# Patient Record
Sex: FEMALE | Race: BLACK OR AFRICAN AMERICAN | NOT HISPANIC OR LATINO | Employment: OTHER | URBAN - METROPOLITAN AREA
[De-identification: names, ages, dates, MRNs, and addresses within clinical notes are randomized per-mention and may not be internally consistent; named-entity substitution may affect disease eponyms.]

---

## 2024-04-17 ENCOUNTER — HOSPITAL ENCOUNTER (EMERGENCY)
Facility: HOSPITAL | Age: 74
Discharge: HOME | End: 2024-04-17
Attending: EMERGENCY MEDICINE
Payer: COMMERCIAL

## 2024-04-17 VITALS
RESPIRATION RATE: 16 BRPM | HEART RATE: 89 BPM | SYSTOLIC BLOOD PRESSURE: 149 MMHG | TEMPERATURE: 98.4 F | DIASTOLIC BLOOD PRESSURE: 87 MMHG | OXYGEN SATURATION: 98 %

## 2024-04-17 DIAGNOSIS — L03.213 PRESEPTAL CELLULITIS OF RIGHT EYE: Primary | ICD-10-CM

## 2024-04-17 DIAGNOSIS — H60.509 ACUTE OTITIS EXTERNA, UNSPECIFIED LATERALITY, UNSPECIFIED TYPE: ICD-10-CM

## 2024-04-17 PROCEDURE — 99283 EMERGENCY DEPT VISIT LOW MDM: CPT

## 2024-04-17 PROCEDURE — 2500000001 HC RX 250 WO HCPCS SELF ADMINISTERED DRUGS (ALT 637 FOR MEDICARE OP)

## 2024-04-17 PROCEDURE — 99284 EMERGENCY DEPT VISIT MOD MDM: CPT | Performed by: EMERGENCY MEDICINE

## 2024-04-17 RX ORDER — AMOXICILLIN AND CLAVULANATE POTASSIUM 875; 125 MG/1; MG/1
1 TABLET, FILM COATED ORAL EVERY 12 HOURS
Qty: 14 TABLET | Refills: 0 | Status: SHIPPED | OUTPATIENT
Start: 2024-04-17 | End: 2024-04-24

## 2024-04-17 RX ORDER — CIPROFLOXACIN AND DEXAMETHASONE 3; 1 MG/ML; MG/ML
4 SUSPENSION/ DROPS AURICULAR (OTIC) 2 TIMES DAILY
Qty: 7.5 ML | Refills: 0 | Status: SHIPPED | OUTPATIENT
Start: 2024-04-17 | End: 2024-04-24

## 2024-04-17 RX ORDER — AMOXICILLIN AND CLAVULANATE POTASSIUM 875; 125 MG/1; MG/1
1 TABLET, FILM COATED ORAL ONCE
Status: COMPLETED | OUTPATIENT
Start: 2024-04-17 | End: 2024-04-17

## 2024-04-17 RX ADMIN — AMOXICILLIN AND CLAVULANATE POTASSIUM 1 TABLET: 875; 125 TABLET, FILM COATED ORAL at 17:27

## 2024-04-17 ASSESSMENT — COLUMBIA-SUICIDE SEVERITY RATING SCALE - C-SSRS
2. HAVE YOU ACTUALLY HAD ANY THOUGHTS OF KILLING YOURSELF?: NO
6. HAVE YOU EVER DONE ANYTHING, STARTED TO DO ANYTHING, OR PREPARED TO DO ANYTHING TO END YOUR LIFE?: NO
1. IN THE PAST MONTH, HAVE YOU WISHED YOU WERE DEAD OR WISHED YOU COULD GO TO SLEEP AND NOT WAKE UP?: NO

## 2024-04-17 NOTE — ED PROVIDER NOTES
HPI   Chief Complaint   Patient presents with    Eye Drainage       Patient 74-year-old female with past medical history of T2DM, arthritis presenting with chief complaint of right eye redness and swelling.  Endorses she woke up with symptoms acutely worse today.  Does endorse she has been rubbing in the last week but was otherwise largely asymptomatic until this morning.  Does not endorse fevers, chills, malaise.  Endorses blood sugars have been well-controlled on metformin.  Does endorse additional concern for right ear infection as she has had some whitish drainage from the ear.  Does not endorse using Q-tips or anything else to disimpact.  Endorses seasonal allergies                        No data recorded                   Patient History   No past medical history on file.  No past surgical history on file.  No family history on file.  Social History     Tobacco Use    Smoking status: Not on file    Smokeless tobacco: Not on file   Substance Use Topics    Alcohol use: Not on file    Drug use: Not on file       Physical Exam   ED Triage Vitals [04/17/24 1548]   Temperature Heart Rate Respirations BP   36.9 °C (98.4 °F) 89 16 149/87      Pulse Ox Temp src Heart Rate Source Patient Position   98 % -- -- --      BP Location FiO2 (%)     -- --       Physical Exam  Constitutional:       Appearance: Normal appearance.   HENT:      Head: Normocephalic and atraumatic.      Ears:      Comments: Left tympanic membrane only partially visualized due to anatomy but no effusion, no bulging of TM no external drainage    Right tympanic membrane not visualized due to whitish discharge in ear canal.  No mastoid tenderness, no erythema, no warmth.  No otherwise localized swelling or bony tenderness.  Difficult to visualize TM after debridement due to stiff ears bilaterally and contorted anatomy that is symmetric bilaterally.      Eyes:      Extraocular Movements: Extraocular movements intact.      Comments: Diffuse chemosis of  right conjunctiva.  No involvement of iris or retina on slit-lamp.  No cell and flare.  Visual acuity 20/50 on the left and 20/40 on the right but patient Dors that she usually wears glasses and does not have them.  Pupils equal and reactive 4-2 bilaterally.  Extraocular motions intact without pain.   Cardiovascular:      Rate and Rhythm: Normal rate.   Pulmonary:      Effort: Pulmonary effort is normal.   Musculoskeletal:         General: Normal range of motion.      Cervical back: Normal range of motion.   Skin:     General: Skin is warm and dry.   Neurological:      General: No focal deficit present.      Mental Status: She is alert and oriented to person, place, and time.   Psychiatric:         Mood and Affect: Mood normal.         Behavior: Behavior normal.         ED Course & MDM   Diagnoses as of 04/17/24 1740   Preseptal cellulitis of right eye   Acute otitis externa, unspecified laterality, unspecified type       Medical Decision Making  Patient 74-year-old female with past medical history of T2DM, arthritis presenting with chief complaint of right eye redness and swelling and additional concern for drainage from right ear.  Patient with evidence of chemosis and preseptal swelling of right eye concerning for preseptal cellulitis.  Ocular motion intact without pain, no proptosis, no visual acuity changes reducing concern for orbital cellulitis and CT imaging deferred at this time.  Patient given dose of Augmentin here and prescribed course of Augmentin.  Appropriate ophthalmology follow-up and return precautions if symptoms do not improve discussed with patient.  Exam otherwise notable for concern for possible otitis externa of right ear.  Does have whitish drainage but no other localized evidence of infection.  No mastoid tenderness, no bony tenderness, no erythema and CT imaging to evaluate for malignant otitis externa deferred at this time.  Patient prescribed ciprofloxacin-dexamethasone for treatment of  otitis externa.  Return precautions for both of these conditions discussed with patient and patient discharged home.    Patient seen and discussed with Dr. Indiana Billy MD, PhD  Emergency Medicine PGY2          Procedure  Procedures     Tigre Billy MD  Resident  04/17/24 1747    -------------------------------------------  This patient was seen by the resident physician.  I have seen and examined the patient, agree with the workup, evaluation, management and diagnosis. I reviewed and edited the above documentation where necessary.     Oliver Be MD  EM Attending Physician      Oliver Be MD MPH  04/18/24 0029

## 2024-04-17 NOTE — DISCHARGE INSTRUCTIONS
We recommend following up with an ophthalmologist for reassessment at the number below.  We would expect to see improvement in 1 to 2 days in the eye and if you do not, we recommend more expedited follow-up.  If you begin having any vision changes, protrusion of the eye or pain with movement eye these would all be reasons to return emergently.  For the ear we would be mindful of any pain in the bone around your ear or signs of worsening infection that would be reason to follow-up with an ENT doctor or return to the ED.

## 2024-05-05 ENCOUNTER — HOSPITAL ENCOUNTER (EMERGENCY)
Facility: HOSPITAL | Age: 74
Discharge: HOME | End: 2024-05-06
Attending: EMERGENCY MEDICINE
Payer: COMMERCIAL

## 2024-05-05 ENCOUNTER — APPOINTMENT (OUTPATIENT)
Dept: RADIOLOGY | Facility: HOSPITAL | Age: 74
End: 2024-05-05
Payer: COMMERCIAL

## 2024-05-05 DIAGNOSIS — H16.8 BACTERIAL KERATITIS: Primary | ICD-10-CM

## 2024-05-05 DIAGNOSIS — H10.9 BACTERIAL CONJUNCTIVITIS: ICD-10-CM

## 2024-05-05 LAB
ALBUMIN SERPL BCP-MCNC: 4.6 G/DL (ref 3.4–5)
ALP SERPL-CCNC: 104 U/L (ref 33–136)
ALT SERPL W P-5'-P-CCNC: 17 U/L (ref 7–45)
ANION GAP SERPL CALC-SCNC: 12 MMOL/L (ref 10–20)
AST SERPL W P-5'-P-CCNC: 14 U/L (ref 9–39)
BASOPHILS # BLD AUTO: 0.04 X10*3/UL (ref 0–0.1)
BASOPHILS NFR BLD AUTO: 0.4 %
BILIRUB SERPL-MCNC: 0.8 MG/DL (ref 0–1.2)
BUN SERPL-MCNC: 8 MG/DL (ref 6–23)
CALCIUM SERPL-MCNC: 9.8 MG/DL (ref 8.6–10.6)
CHLORIDE SERPL-SCNC: 101 MMOL/L (ref 98–107)
CO2 SERPL-SCNC: 33 MMOL/L (ref 21–32)
CREAT SERPL-MCNC: 0.81 MG/DL (ref 0.5–1.05)
EGFRCR SERPLBLD CKD-EPI 2021: 76 ML/MIN/1.73M*2
EOSINOPHIL # BLD AUTO: 0.03 X10*3/UL (ref 0–0.4)
EOSINOPHIL NFR BLD AUTO: 0.3 %
ERYTHROCYTE [DISTWIDTH] IN BLOOD BY AUTOMATED COUNT: 13.5 % (ref 11.5–14.5)
GLUCOSE SERPL-MCNC: 111 MG/DL (ref 74–99)
HCT VFR BLD AUTO: 41.3 % (ref 36–46)
HGB BLD-MCNC: 13.7 G/DL (ref 12–16)
HOLD SPECIMEN: NORMAL
HOLD SPECIMEN: NORMAL
IMM GRANULOCYTES # BLD AUTO: 0.09 X10*3/UL (ref 0–0.5)
IMM GRANULOCYTES NFR BLD AUTO: 1 % (ref 0–0.9)
LYMPHOCYTES # BLD AUTO: 1.88 X10*3/UL (ref 0.8–3)
LYMPHOCYTES NFR BLD AUTO: 20.1 %
MCH RBC QN AUTO: 29.7 PG (ref 26–34)
MCHC RBC AUTO-ENTMCNC: 33.2 G/DL (ref 32–36)
MCV RBC AUTO: 89 FL (ref 80–100)
MONOCYTES # BLD AUTO: 0.76 X10*3/UL (ref 0.05–0.8)
MONOCYTES NFR BLD AUTO: 8.1 %
NEUTROPHILS # BLD AUTO: 6.54 X10*3/UL (ref 1.6–5.5)
NEUTROPHILS NFR BLD AUTO: 70.1 %
NRBC BLD-RTO: 0 /100 WBCS (ref 0–0)
PLATELET # BLD AUTO: 216 X10*3/UL (ref 150–450)
POTASSIUM SERPL-SCNC: 4.2 MMOL/L (ref 3.5–5.3)
PROT SERPL-MCNC: 7.6 G/DL (ref 6.4–8.2)
RBC # BLD AUTO: 4.62 X10*6/UL (ref 4–5.2)
SODIUM SERPL-SCNC: 142 MMOL/L (ref 136–145)
WBC # BLD AUTO: 9.3 X10*3/UL (ref 4.4–11.3)

## 2024-05-05 PROCEDURE — 70481 CT ORBIT/EAR/FOSSA W/DYE: CPT | Performed by: STUDENT IN AN ORGANIZED HEALTH CARE EDUCATION/TRAINING PROGRAM

## 2024-05-05 PROCEDURE — 85025 COMPLETE CBC W/AUTO DIFF WBC: CPT

## 2024-05-05 PROCEDURE — 70481 CT ORBIT/EAR/FOSSA W/DYE: CPT | Mod: 59

## 2024-05-05 PROCEDURE — 36415 COLL VENOUS BLD VENIPUNCTURE: CPT

## 2024-05-05 PROCEDURE — 99285 EMERGENCY DEPT VISIT HI MDM: CPT | Performed by: EMERGENCY MEDICINE

## 2024-05-05 PROCEDURE — 80053 COMPREHEN METABOLIC PANEL: CPT

## 2024-05-05 PROCEDURE — 2550000001 HC RX 255 CONTRASTS: Performed by: EMERGENCY MEDICINE

## 2024-05-05 PROCEDURE — 2500000001 HC RX 250 WO HCPCS SELF ADMINISTERED DRUGS (ALT 637 FOR MEDICARE OP)

## 2024-05-05 PROCEDURE — 99285 EMERGENCY DEPT VISIT HI MDM: CPT | Mod: 25

## 2024-05-05 PROCEDURE — 70460 CT HEAD/BRAIN W/DYE: CPT

## 2024-05-05 PROCEDURE — 70460 CT HEAD/BRAIN W/DYE: CPT | Performed by: STUDENT IN AN ORGANIZED HEALTH CARE EDUCATION/TRAINING PROGRAM

## 2024-05-05 PROCEDURE — 99284 EMERGENCY DEPT VISIT MOD MDM: CPT | Mod: 25

## 2024-05-05 RX ORDER — ACETAMINOPHEN 325 MG/1
975 TABLET ORAL ONCE
Status: COMPLETED | OUTPATIENT
Start: 2024-05-05 | End: 2024-05-05

## 2024-05-05 RX ORDER — ERYTHROMYCIN 5 MG/G
1 OINTMENT OPHTHALMIC NIGHTLY
Status: DISCONTINUED | OUTPATIENT
Start: 2024-05-05 | End: 2024-05-06 | Stop reason: HOSPADM

## 2024-05-05 RX ORDER — TETRACAINE HYDROCHLORIDE 5 MG/ML
1 SOLUTION OPHTHALMIC ONCE
Status: COMPLETED | OUTPATIENT
Start: 2024-05-05 | End: 2024-05-05

## 2024-05-05 RX ADMIN — IOHEXOL 80 ML: 350 INJECTION, SOLUTION INTRAVENOUS at 19:21

## 2024-05-05 RX ADMIN — FLUORESCEIN SODIUM 1 STRIP: 1 STRIP OPHTHALMIC at 16:45

## 2024-05-05 RX ADMIN — ACETAMINOPHEN 975 MG: 325 TABLET ORAL at 16:59

## 2024-05-05 RX ADMIN — TETRACAINE HYDROCHLORIDE 1 DROP: 5 SOLUTION OPHTHALMIC at 16:45

## 2024-05-05 ASSESSMENT — LIFESTYLE VARIABLES
HAVE YOU EVER FELT YOU SHOULD CUT DOWN ON YOUR DRINKING: NO
EVER HAD A DRINK FIRST THING IN THE MORNING TO STEADY YOUR NERVES TO GET RID OF A HANGOVER: NO
EVER FELT BAD OR GUILTY ABOUT YOUR DRINKING: NO
HAVE PEOPLE ANNOYED YOU BY CRITICIZING YOUR DRINKING: NO
TOTAL SCORE: 0

## 2024-05-05 ASSESSMENT — VISUAL ACUITY
OU: 20/50
OD: 0
OS: 20/40

## 2024-05-05 ASSESSMENT — COLUMBIA-SUICIDE SEVERITY RATING SCALE - C-SSRS
6. HAVE YOU EVER DONE ANYTHING, STARTED TO DO ANYTHING, OR PREPARED TO DO ANYTHING TO END YOUR LIFE?: NO
1. IN THE PAST MONTH, HAVE YOU WISHED YOU WERE DEAD OR WISHED YOU COULD GO TO SLEEP AND NOT WAKE UP?: NO
2. HAVE YOU ACTUALLY HAD ANY THOUGHTS OF KILLING YOURSELF?: NO

## 2024-05-05 ASSESSMENT — PAIN SCALES - GENERAL: PAINLEVEL_OUTOF10: 10 - WORST POSSIBLE PAIN

## 2024-05-05 ASSESSMENT — PAIN - FUNCTIONAL ASSESSMENT: PAIN_FUNCTIONAL_ASSESSMENT: 0-10

## 2024-05-05 NOTE — PROGRESS NOTES
I received Estela Holder in signout from Dr. Serrano.  Please see the previous note for all HPI, PE and MDM up to the time of signout at 1900 hrs.    In brief Estela Holder is an 74 y.o. female presenting for   Chief Complaint   Patient presents with    Eye Problem   .  At the time of signout we were awaiting: Ophthalmology consult and recommendations along with CT head and orbit scans.    CT HEAD:  1. No evidence of hemorrhage, CT apparent transcortical infarct, or  other acute intracranial abnormality. No evidence of dural venous  thrombus.  2. Subtle attenuation changes are present in the periventricular and  subcortical white matter of bilateral cerebral hemispheres,  nonspecific findings favored to represent sequela of microvascular  disease.      CT ORBITS:  1. Asymmetric soft tissue swelling with mild infraorbital fat  stranding bilaterally, right-greater-than-left, suggestive of  underlying preseptal cellulitis, without evidence of discrete fluid  collection, soft tissue gas, or extension of the inflammatory changes  into the orbits.  2. Bony defect of the right lamina papyracea without fluid or  reactive changes present in the adjacent fat or right-sided ethmoidal  air cells, likely representing sequela of remote trauma versus  congenital defect.    Per ophthalmology, patient has bacterial keratitis and bacterial conjunctivitis and recommended vancomycin drops, tobramycin eyedrops, and erythromycin ointment with close follow-up on Monday 5/6 at [facility].  A drop sheet was provided with instructions and risks and benefits discussed.  Patient discharged in good condition with the prescriptions and follow-up instructions per ophthalmology recommendations..  Patient understood was agreeable to plan.    Pt Disposition: Discharged home in good condition with prescriptions for tobramycin and vancomycin drops and erythromycin ointment, follow-up appointment, and strict return precautions.

## 2024-05-05 NOTE — ED TRIAGE NOTES
Pt c/o recurrent R eye orital cellulitis. Was seem on 4/17 and discharged with ABX stated she had some improvement but went back to what it was.

## 2024-05-05 NOTE — ED PROVIDER NOTES
CC: Eye Problem     HPI:  Patient is a 74-year-old female with type 2 diabetes, arthritis, hyperlipidemia, asthma, hypertension who is presenting to the emergency department today with concern for drainage from the bilateral eyes, worsening pain and swelling of the right orbit in addition to photophobia and headache associated on the right side.  Patient states when she does open her right eye her vision is blurry however she is not able to open it for long secondary to photophobia.  Denies any fever or chills.  Denies any injuries to the area.  Her symptoms started approximately 20 days ago.  She was seen in the emergency department approximately 18 days ago was prescribed Augmentin in addition to eardrops for right ear drainage.  Patient states she was compliant with her medications, her swelling initially improved however since then it has worsened, is now associated photophobia and headache.  She notes the head pain is only over the right frontal side of her head.  States she has been intermittently taking Tylenol for it, last dose was around 9 PM last night with improvement in symptoms. Does not wear contacts, denies any injuries to the area. Denies similar symptoms in the past. Has been unable to follow up optho or any other doctor since visit here last time. Has optho appointment in June. Was visiting sister who is in hospice and just returned.     Limitations to History: none  Additional History provided by: N/A    External Records Reviewed:  Recent available ED and inpatient notes reviewed in EMR.  Reviewed the records from recent ED visit 04/17/2024    PMHx/PSHx:  Per HPI.   - has no past medical history on file.  - has no past surgical history on file.    Medications:  Reviewed in EMR. See EMR for complete list of medications and doses.    Allergies:  Patient has no known allergies.    Social History:  - Tobacco:  has no history on file for tobacco use.   - Alcohol:  has no history on file for alcohol  use.   - Illicit Drugs:  has no history on file for drug use.     ROS:  Per HPI.     ???????????????????????????????????????????????????????????????  Triage Vitals:  T 36.9 °C (98.5 °F)  HR 92  /71  RR 18  O2 96 % None (Room air)    Physical Exam  Constitutional:       General: She is not in acute distress.     Appearance: Normal appearance. She is not toxic-appearing.   HENT:      Head: Normocephalic and atraumatic.      Mouth/Throat:      Mouth: Mucous membranes are moist.   Eyes:      General: No scleral icterus.     Conjunctiva/sclera: Conjunctivae normal.      Comments: Conjunctival erythema bilaterally, dried material.  Drainage over the eyes bilaterally, swelling/tenderness under the right eye.  Redness and crusting around the right eye.    Right eye pressure: 23  Left eye pressure: 26  Fluorescein stain exam: no increased dye uptake, concern for abrasions or lacerations bilaterally  Visual acuity right eye: unable to obtain due to photophobia  Visual acuity left eye: 20/40 does wear glasses she does not have with her.   Cardiovascular:      Rate and Rhythm: Normal rate and regular rhythm.      Pulses: Normal pulses.   Pulmonary:      Effort: Pulmonary effort is normal.      Breath sounds: Normal breath sounds.   Abdominal:      General: There is no distension.      Palpations: Abdomen is soft.      Tenderness: There is no abdominal tenderness.   Musculoskeletal:         General: Normal range of motion.      Cervical back: Normal range of motion and neck supple.   Skin:     General: Skin is warm and dry.   Neurological:      General: No focal deficit present.      Mental Status: She is alert.   Psychiatric:         Mood and Affect: Mood normal.         Behavior: Behavior normal.         Thought Content: Thought content normal.         Judgment: Judgment normal.       ???????????????????????????????????????????????????????????????  ED Course:  Diagnoses as of 05/06/24 2115   Bacterial keratitis  "  Bacterial conjunctivitis       EKG & Images:  Independently reviewed, See ED Course      MDM:  - The patient is a 74-year-old female with the above past medical history who presented to the emergency department today with concern for worsening right eye swelling, conjunctival erythema, drainage in addition to decreased vision and photophobia.  Patient notes that she is able to see from the left eye, not as photophobic and does not have significant pain.  On my examination I am unable to test the patient's extraocular movements secondary to photophobia and patient unable to keep her eyes open.  I did use 1 drop of tetracaine in the patient's right eye to see if that would improve her symptoms, she noted mild improvement in symptoms of the right eye still photophobic and unable to keep the eye open.  A fluorescein stain exam was also done without any significant findings.  Due to the concerning presentation of the patient in addition to failed outpatient antibiotics ophthalmology was consulted who recommended that a CT scan of the orbits with and without contrast in addition to a CT scan of the head be performed and they will come evaluate the patient in the emergency department.  At the time of signout the patient was stable, was pending evaluation by ophthalmology and their further recommendations.  Patient was signed out to oncoming provider in stable condition at 1900.    Final diagnoses:   [H16.8] Bacterial keratitis   [H10.9] Bacterial conjunctivitis         Social Determinants Limiting Care:  None identified    Disposition:  Handoff    Iram \"Adria\" Zach  Emergency Medicine Resident, PGY1  [facility]   This patient was seen and staffed with Dr. Be    Disclaimer: This note was dictated by speech recognition. Minor errors in transcription may be present    Procedures ? [employer] last updated 5/6/2024 9:15 PM        Iram Serrano DO  Resident  05/06/24 " 2110

## 2024-05-06 VITALS
TEMPERATURE: 98.5 F | HEART RATE: 102 BPM | HEIGHT: 65 IN | WEIGHT: 293 LBS | DIASTOLIC BLOOD PRESSURE: 89 MMHG | OXYGEN SATURATION: 95 % | BODY MASS INDEX: 48.82 KG/M2 | RESPIRATION RATE: 18 BRPM | SYSTOLIC BLOOD PRESSURE: 144 MMHG

## 2024-05-06 PROCEDURE — 2500000001 HC RX 250 WO HCPCS SELF ADMINISTERED DRUGS (ALT 637 FOR MEDICARE OP)

## 2024-05-06 PROCEDURE — 2500000004 HC RX 250 GENERAL PHARMACY W/ HCPCS (ALT 636 FOR OP/ED)

## 2024-05-06 PROCEDURE — A4217 STERILE WATER/SALINE, 500 ML: HCPCS

## 2024-05-06 RX ADMIN — ERYTHROMYCIN 1 CM: 5 OINTMENT OPHTHALMIC at 00:35

## 2024-05-06 RX ADMIN — WATER 1 DROP: 1 INJECTION INTRAMUSCULAR; INTRAVENOUS; SUBCUTANEOUS at 00:35

## 2024-05-06 RX ADMIN — TOBRAMYCIN 1 DROP: 3 SOLUTION/ DROPS OPHTHALMIC at 00:34

## 2024-05-06 NOTE — CONSULTS
Reason For Consult  C/f cellulitis    History Of Present Illness  Estela Holder is a 74 y.o. female with no past ocular history presenting with swelling, erythema and discharge from both eyes. Patient was seen 4/17 at [facility] ED and dx with preseptal cellulitis at that time and given a 7 day course of augmentin for symptoms of right lid swelling. Patient was not given topical drops at that time. Patient states thsat swelling improved with oral antibiotics but over the last few days she has noticed redness, irritation and some purulent discharge out of both eyes. Both eyes are photophobic. Vision has been stable besides intermittent blurring that she can blink away. Denies flashes/loss of vision. Some floaters over the last few weeks.     Past Medical History  She has no past medical history on file.    Physical Exam  Base Eye Exam       Visual Acuity (Snellen - Linear)         Right Left    Dist sc 20/40 20/40    Dist ph sc 20/20 -2 20/25 +2              Tonometry (Tonopen, 8:12 PM)         Right Left    Pressure 18 18              Pupils         Dark Light Shape React APD    Right 3 2.5 Round Brisk None    Left 2 1.5 Round Brisk None              Visual Fields (Counting fingers)         Left Right     Full Full              Extraocular Movement         Right Left     Full, Ortho Full, Ortho              Neuro/Psych       Oriented x3: Yes                  Additional Tests       Color         Right Left    [place] 11/11 11/11                  Slit Lamp and Fundus Exam       External Exam         Right Left    External Epidermal breakdown of periorbital skin, no purulence, discharge. No bullous lesions Normal              Slit Lamp Exam         Right Left    Lids/Lashes trace soft edema and 1+ erythema of upper and lower lid trace soft edema and erythema of upper and lower lid    Conjunctiva/Sclera 3+ diffuse injection, stringy mucus and some purulent discharge 3+ diffuse injection, stringy mucus and some purulent  "discharge    Cornea 3-4+ SPK, 2x epi defects with surroudning haze at 5 and 7 o clock 3-4+ SPK    Anterior Chamber Deep and quiet Deep and quiet    Iris Round and reactive Round and reactive    Lens 2+ NS 2+ NS    Anterior Vitreous Normal Normal              Fundus Exam         Right Left    Disc  Normal    C/D Ratio  0.30    Macula  Normal    Vessels  Normal    Periphery  Normal    Hazy views OD from surface disease                  B scan OD without signs of vitreitis, retinal tears/detachment     Last Recorded Vitals  Blood pressure 150/83, pulse 96, temperature 36.9 °C (98.5 °F), temperature source Oral, resp. rate 18, height 1.651 m (5' 5\"), weight 135 kg (298 lb), SpO2 (!) 93%.    Assessment/Plan   This is a 74 year old woman presenting with eye redness and discharge after treatment for preseptal cellulitis 2 weeks ago. There is no EOM restriction, pupillary changes, vision changes, color vision or exam findings concerning for posterior spread of disease. Anterior segment exam remarkable for diffuse injection OU, OD with 2 small <1mm peripheral staining epithelial defects with surrounding haze/infiltrate concerning for bacterial keratitis. OS without staining lesions or epithelial breakdown. No AC reaction/hypopyon OU. DFE OS wnl, and b scan OD without concern for vitreitis, retinal pathology. Recommend aggressive antibiotics and close follow up as below.     #Bacterial keratitis OD  #Bacterial conjunctivitis OU   - Defer scrapings as small peripheral lesions   - Recommend fortified vancomycin ophthalmic drops q1h both eyes  - Recommend fortified tobramycin ophthalmic drop q1h both eyes  - Recommend erythromycin ophthalmic ointment qhs both eyes  - Close follow up - recommend Monday 5/6/2024 2:00 PM at [facility]   - Drop sheet provided with instructions  - Patient informed that if lost to follow up or drops done incorrectly, risk of permanent scarring, further spread of infection and vision loss      Luis " MELVIN Holder MD  PGY2 Ophthalmology     Ophthalmology Adult Pager - [phone]  Ophthalmology Pediatrics Pager - [phone]    For adult follow-up appointments, call: [phone]  For pediatric follow-up appointments, call: [phone]      NOTE: This note is not finalized until attending reviews and signs.

## 2024-05-06 NOTE — DISCHARGE INSTRUCTIONS
You are seen emergency department today for concerns of eye pain.  Thorough examination was performed including consultation with ophthalmology.  Ophthalmology has provided you with prescriptions for vancomycin and tobramycin eyedrops as well as erythromycin ointment, to be taken as directed.  They have also recommended a close follow-up appointment on 5/6 at [place].  They have also provided you with drop sheets with instructions.  You are safely discharged at this time.  However, should you have any new, worsening, concerning symptoms please report back to the emergency department.

## 2024-05-07 ENCOUNTER — APPOINTMENT (OUTPATIENT)
Dept: OPHTHALMOLOGY | Facility: CLINIC | Age: 74
End: 2024-05-07
Payer: COMMERCIAL

## 2024-05-14 ENCOUNTER — OFFICE VISIT (OUTPATIENT)
Dept: OPHTHALMOLOGY | Facility: CLINIC | Age: 74
End: 2024-05-14
Payer: COMMERCIAL

## 2024-05-14 DIAGNOSIS — H10.89: ICD-10-CM

## 2024-05-14 DIAGNOSIS — H10.13 ALLERGIC CONJUNCTIVITIS OF BOTH EYES: ICD-10-CM

## 2024-05-14 DIAGNOSIS — H10.013 ACUTE FOLLICULAR CONJUNCTIVITIS OF BOTH EYES: Primary | ICD-10-CM

## 2024-05-14 DIAGNOSIS — H16.001 CORNEAL ULCER OF RIGHT EYE: ICD-10-CM

## 2024-05-14 PROCEDURE — 99213 OFFICE O/P EST LOW 20 MIN: CPT | Performed by: OPHTHALMOLOGY

## 2024-05-14 RX ORDER — NEOMYCIN SULFATE, POLYMYXIN B SULFATE, AND DEXAMETHASONE 3.5; 10000; 1 MG/G; [USP'U]/G; MG/G
OINTMENT OPHTHALMIC
Qty: 3.5 G | Refills: 1 | Status: SHIPPED | OUTPATIENT
Start: 2024-05-14

## 2024-05-14 RX ORDER — TOBRAMYCIN AND DEXAMETHASONE 3; 1 MG/ML; MG/ML
1 SUSPENSION/ DROPS OPHTHALMIC 3 TIMES DAILY
Qty: 5 ML | Refills: 0 | Status: SHIPPED | OUTPATIENT
Start: 2024-05-14 | End: 2024-06-13

## 2024-05-14 ASSESSMENT — TONOMETRY
IOP_METHOD: TONOPEN
OS_IOP_MMHG: 15
OD_IOP_MMHG: 12

## 2024-05-14 ASSESSMENT — VISUAL ACUITY
OD_CC: 20/40
METHOD: SNELLEN - LINEAR
OS_CC: 20/20-

## 2024-05-14 ASSESSMENT — EXTERNAL EXAM - LEFT EYE: OS_EXAM: NORMAL

## 2024-05-14 NOTE — PROGRESS NOTES
"Assessment/Plan   Diagnoses and all orders for this visit:  Acute follicular conjunctivitis of both eyes  Conjunctival mucous membrane ulceration  Allergic conjunctivitis of both eyes  Pt was seen in ED 1 week ago with ulcer OD, redness, discharge OU  Reports hx of ear infection simultaneously  Has hx of \"arthritis\" but unsure what it is   No previous similar episodes  Exam shows raised nodules on conj OU nasally with some overlying ulceration along with some punctate staining of the cornea  Unsure of etiology but could include: scleritis, viral conjunctivitis, allergic blepharoconjunctivitis. Low concern for staph marginal.  Will do empiric treatment with tobradex and maxitrol chantel and ATs  -     tobramycin-dexamethasone (Tobradex) ophthalmic suspension; Administer 1 drop into both eyes 3 times a day.  -     neomycin-polymyxin B-dexameth (Polydex) 3.5 mg/g-10,000 unit/g-0.1 % ointment ophthalmic ointment; Apply to both eyelids at bedtime  Corneal ulcer of right eye  Resolved  "